# Patient Record
Sex: FEMALE | Race: WHITE | HISPANIC OR LATINO | Employment: UNEMPLOYED | URBAN - METROPOLITAN AREA
[De-identification: names, ages, dates, MRNs, and addresses within clinical notes are randomized per-mention and may not be internally consistent; named-entity substitution may affect disease eponyms.]

---

## 2017-03-17 RX ORDER — LEVOTHYROXINE SODIUM 0.03 MG/1
TABLET ORAL
Qty: 30 TABLET | Refills: 3 | Status: SHIPPED | OUTPATIENT
Start: 2017-03-17 | End: 2017-07-14 | Stop reason: SDUPTHER

## 2017-03-22 ENCOUNTER — APPOINTMENT (OUTPATIENT)
Dept: WOMENS IMAGING | Facility: HOSPITAL | Age: 47
End: 2017-03-22

## 2017-03-22 PROCEDURE — 77063 BREAST TOMOSYNTHESIS BI: CPT | Performed by: RADIOLOGY

## 2017-03-22 PROCEDURE — 77067 SCR MAMMO BI INCL CAD: CPT | Performed by: RADIOLOGY

## 2017-03-23 ENCOUNTER — APPOINTMENT (OUTPATIENT)
Dept: WOMENS IMAGING | Facility: HOSPITAL | Age: 47
End: 2017-03-23

## 2017-03-23 ENCOUNTER — OFFICE VISIT (OUTPATIENT)
Dept: ENDOCRINOLOGY | Age: 47
End: 2017-03-23

## 2017-03-23 VITALS
HEIGHT: 64 IN | HEART RATE: 75 BPM | SYSTOLIC BLOOD PRESSURE: 116 MMHG | OXYGEN SATURATION: 97 % | BODY MASS INDEX: 22.36 KG/M2 | WEIGHT: 131 LBS | DIASTOLIC BLOOD PRESSURE: 68 MMHG

## 2017-03-23 DIAGNOSIS — E03.8 OTHER SPECIFIED HYPOTHYROIDISM: Primary | ICD-10-CM

## 2017-03-23 LAB
T4 FREE SERPL-MCNC: 1.45 NG/DL (ref 0.93–1.7)
TSH SERPL DL<=0.005 MIU/L-ACNC: 2.47 MIU/ML (ref 0.27–4.2)

## 2017-03-23 PROCEDURE — 99214 OFFICE O/P EST MOD 30 MIN: CPT | Performed by: INTERNAL MEDICINE

## 2017-03-23 RX ORDER — ERGOCALCIFEROL 1.25 MG/1
50000 CAPSULE ORAL WEEKLY
COMMUNITY
End: 2018-01-05 | Stop reason: SDUPTHER

## 2017-03-23 RX ORDER — ESTRADIOL 2 MG/1
TABLET ORAL
COMMUNITY
Start: 2017-03-17

## 2017-03-23 NOTE — PROGRESS NOTES
46 y.o.    Patient Care Team:  Deirdre Ngo MD as PCP - General (Obstetrics and Gynecology)    Chief Complaint:      Subjective     HPI  Torrie Street 46 y.o. WF is here as a follow up patient for hypothyroidism. Is referred by Dr. Ngo.   Currently on levothyroxine 25 µg oral daily.  Taking the medication everyday on empty stomach.    Patient reports that her fatigue significantly improved on the thyroid medication, her weight is stable, complains of cold intolerance occasionally and no heat intolerance, no constipation or diarrhea, is able to sleep well.  No complaints of tremors, no racing of heart and no eye symptoms.  Denied difficulty in swallowing, breathing or change in voice.  No family history of thyroid disease.       Pre mature menopause : Menstrual hx - Menarche at age 14 years, irregular periods with increased blood clots, underwent total hysterectomy for this reason at age 34. [id number]. She has been off and on on the HRT. Has been off on HRT for about 7 years but started back on the medication in 2016.   Estradiol dose was increased to 2 mg oral daily.  Patient does have history of maternal aunt having breast cancer. She does understand the risk benefits of being on the hormone replacement and her risk of getting breast cancer on hormone replacement therapy. Dr. Ngo is currently managing her HRT.        Interval History      The following portions of the patient's history were reviewed and updated as appropriate: allergies, current medications, past family history, past medical history, past social history, past surgical history and problem list.    Past Medical History:   Diagnosis Date   • Endometriosis    • Fracture     right hand   • Migraine    • Osteoporosis      Family History   Problem Relation Age of Onset   • Cancer Maternal Aunt      BREAST   • Diabetes Maternal Aunt    • Cancer Maternal Grandmother      VAGINAL     Social History     Social History   • Marital status: Single      Spouse name: N/A   • Number of children: N/A   • Years of education: N/A     Occupational History   • Not on file.     Social History Main Topics   • Smoking status: Never Smoker   • Smokeless tobacco: Never Used   • Alcohol use Yes      Comment: social   • Drug use: No   • Sexual activity: Not on file     Other Topics Concern   • Not on file     Social History Narrative     No Known Allergies    Current Outpatient Prescriptions:   •  Biotin 1000 MCG tablet, Take 1,000 mcg by mouth daily., Disp: , Rfl:   •  Cholecalciferol (VITAMIN D) 2000 UNITS capsule, Take 1 tablet/day by mouth daily., Disp: , Rfl:   •  estradiol (ESTRACE) 2 MG tablet, , Disp: , Rfl:   •  levothyroxine (SYNTHROID, LEVOTHROID) 25 MCG tablet, TAKE ONE TABLET BY MOUTH DAILY, Disp: 30 tablet, Rfl: 3  •  vitamin D (ERGOCALCIFEROL) 85239 UNITS capsule capsule, Take 50,000 Units by mouth 1 (One) Time Per Week., Disp: , Rfl:   •  aspirin-acetaminophen-caffeine (EXCEDRIN MIGRAINE) 250-250-65 MG per tablet, Take 2 tablets by mouth every 6 (six) hours as needed for headaches., Disp: , Rfl:         Review of Systems   Constitutional: Positive for chills and fatigue. Negative for appetite change and diaphoresis.   HENT: Negative for hearing loss, nosebleeds, postnasal drip, trouble swallowing and voice change.    Eyes: Negative for photophobia, discharge and visual disturbance.   Respiratory: Negative for cough, shortness of breath and wheezing.    Cardiovascular: Positive for palpitations. Negative for chest pain and leg swelling.   Gastrointestinal: Negative for abdominal distention, abdominal pain, constipation, diarrhea, nausea and vomiting.   Endocrine: Positive for cold intolerance. Negative for heat intolerance, polydipsia and polyuria.   Genitourinary: Negative for dysuria, frequency and hematuria.   Musculoskeletal: Positive for back pain. Negative for arthralgias and myalgias.   Skin: Negative for pallor, rash and wound.   Neurological: Positive  "for dizziness and light-headedness. Negative for tremors, seizures, syncope, weakness, numbness and headaches.   Hematological: Negative for adenopathy.   Psychiatric/Behavioral: Positive for sleep disturbance. Negative for agitation and confusion. The patient is nervous/anxious.        Objective       Vitals:    03/23/17 1027   BP: 116/68   Pulse: 75   SpO2: 97%   Weight: 131 lb (59.4 kg)   Height: 64\" (162.6 cm)     Body mass index is 22.49 kg/(m^2).      Physical Exam   Constitutional: She is oriented to person, place, and time. She appears well-developed and well-nourished. No distress.   HENT:   Head: Normocephalic and atraumatic.   Mouth/Throat: Oropharynx is clear and moist.   Eyes: Conjunctivae and EOM are normal. Pupils are equal, round, and reactive to light. No scleral icterus.   Neck: Normal range of motion. Neck supple. No tracheal deviation present. No thyromegaly present.   Cardiovascular: Normal rate, regular rhythm and normal heart sounds.  Exam reveals no friction rub.    No murmur heard.  Pulmonary/Chest: Effort normal and breath sounds normal. No stridor. She has no wheezes. She has no rales.   Abdominal: Soft. Bowel sounds are normal. She exhibits no distension. There is no tenderness.   Musculoskeletal: Normal range of motion. She exhibits no edema.   Lymphadenopathy:     She has no cervical adenopathy.   Neurological: She is alert and oriented to person, place, and time. She has normal reflexes. She displays normal reflexes.   Skin: Skin is warm and dry. She is not diaphoretic.   Psychiatric: She has a normal mood and affect. Judgment normal.   Vitals reviewed.    Results Review:     I reviewed the patient's new clinical results.    Medical records reviewed  Summary:Done      Office Visit on 10/18/2016   Component Date Value Ref Range Status   • 25 Hydroxy, Vitamin D 10/18/2016 62.7  30.0 - 100.0 ng/mL Final    Comment: Reference Range for Total Vitamin D 25(OH)  Deficiency Â  Â <20.0 " ng/mL  Insufficiency 21-29 ng/mL  Sufficiency Â   ng/mL  Toxicity      >100 ng/ml     • Vitamin B-12 10/18/2016 323  211 - 946 pg/mL Final   • Folate 10/18/2016 16.52  7.30 - 26.10 ng/mL Final   • Hemoglobin A1C 10/18/2016 5.20  4.80 - 5.60 % Final    Comment: Hemoglobin A1C Ranges:  Increased Risk for Diabetes  5.7% to 6.4%  Diabetes                     >= 6.5%  Diabetic Goal                < 7.0%     • TSH 10/18/2016 3.150  0.270 - 4.200 mIU/mL Final   • Thyroid Peroxidase Antibody 10/18/2016 332* 0 - 34 IU/mL Final     Lab Results   Component Value Date    HGBA1C 5.20 10/18/2016     No results found for: GLUF, MICROALBUR, LDLCALC, CREATININE  Imaging Results (most recent)     None                Assessment and Plan:    Diagnoses and all orders for this visit:    Other specified hypothyroidism  -     TSH  -     T4, Free  -     TSH; Future  -     T4, Free; Future    Hypothyroidism  Continue levothyroxine 25 µg oral daily  Will check TSH and free T4 today    Premature menopause  Managed by Dr. Ngo.  Currently on hormone replacement therapy.    Osteopenia  Patient receiving reclast injections.  Managed by Dr. Ngo.     The total time spent more than 25 min for old record and lab review and face- to- face, of which greater than 50% of time was spent in counseling the patient on treatment options, instructions for management/treatment and follow up and importance of compliance with chosen management or treatment options

## 2017-06-21 ENCOUNTER — RESULTS ENCOUNTER (OUTPATIENT)
Dept: ENDOCRINOLOGY | Age: 47
End: 2017-06-21

## 2017-06-21 DIAGNOSIS — E03.8 OTHER SPECIFIED HYPOTHYROIDISM: ICD-10-CM

## 2017-06-23 ENCOUNTER — OFFICE VISIT (OUTPATIENT)
Dept: ENDOCRINOLOGY | Age: 47
End: 2017-06-23

## 2017-06-23 VITALS
HEIGHT: 64 IN | DIASTOLIC BLOOD PRESSURE: 62 MMHG | BODY MASS INDEX: 22.36 KG/M2 | SYSTOLIC BLOOD PRESSURE: 122 MMHG | OXYGEN SATURATION: 98 % | HEART RATE: 80 BPM | WEIGHT: 131 LBS

## 2017-06-23 DIAGNOSIS — E55.9 VITAMIN D DEFICIENCY: ICD-10-CM

## 2017-06-23 DIAGNOSIS — E03.8 OTHER SPECIFIED HYPOTHYROIDISM: Primary | ICD-10-CM

## 2017-06-23 DIAGNOSIS — M54.32 SCIATICA OF LEFT SIDE: ICD-10-CM

## 2017-06-23 PROCEDURE — 99214 OFFICE O/P EST MOD 30 MIN: CPT | Performed by: INTERNAL MEDICINE

## 2017-06-23 NOTE — PROGRESS NOTES
47 y.o.    Patient Care Team:  Deirdre Ngo MD as PCP - General (Obstetrics and Gynecology)    Chief Complaint:    3 Month Follow Up/ Hypothyroidism  Subjective     HPI  47-year-old female is here for the evaluation of hypothyroidism.  Currently on levothyroxine 25 µg oral daily.  Is compliant with the medication and takes at on empty stomach.  Today in the clinic patient reports that her fatigue is better, able to manage her daily activities, she still has back and knee joint pains, she recently went on a vacation and tripped and as a result things that she has sciatica on her left side, weight has been stable, still complains of cold intolerance, no significant hair loss, occasional leg cramps, normal bowel movements, sleep is better on estrogen.  No tremors, no eye symptoms, no racing of heart.  No difficulty in swallowing breathing or change in voice.  No family history of thyroid disease.    Premature menopause  Menarche at age 14, underwent total hysterectomy due to menorrhagia at age 34, G3 Para 2 Live 2  Due to the complaints of hot flashes patient is currently on estrogen 2 mg oral daily.  She has a family history of breast cancer and during every visit has been emphasizing to the patient to taper the estrogen replacement.  Dr. Ngo is currently managing her hormone replacement therapy.     Interval History      The following portions of the patient's history were reviewed and updated as appropriate: allergies, current medications, past family history, past medical history, past social history, past surgical history and problem list.    Past Medical History:   Diagnosis Date   • Endometriosis    • Fracture     right hand   • Migraine    • Osteoporosis      Family History   Problem Relation Age of Onset   • Cancer Maternal Aunt      BREAST   • Diabetes Maternal Aunt    • Cancer Maternal Grandmother      VAGINAL     Social History     Social History   • Marital status: Single     Spouse name: N/A   •  Number of children: N/A   • Years of education: N/A     Occupational History   • Not on file.     Social History Main Topics   • Smoking status: Never Smoker   • Smokeless tobacco: Never Used   • Alcohol use Yes      Comment: social   • Drug use: No   • Sexual activity: Not on file     Other Topics Concern   • Not on file     Social History Narrative     No Known Allergies    Current Outpatient Prescriptions:   •  aspirin-acetaminophen-caffeine (EXCEDRIN MIGRAINE) 250-250-65 MG per tablet, Take 2 tablets by mouth every 6 (six) hours as needed for headaches., Disp: , Rfl:   •  Biotin 1000 MCG tablet, Take 1,000 mcg by mouth daily., Disp: , Rfl:   •  Cholecalciferol (VITAMIN D) 2000 UNITS capsule, Take 1 tablet/day by mouth daily., Disp: , Rfl:   •  estradiol (ESTRACE) 2 MG tablet, , Disp: , Rfl:   •  levothyroxine (SYNTHROID, LEVOTHROID) 25 MCG tablet, TAKE ONE TABLET BY MOUTH DAILY, Disp: 30 tablet, Rfl: 3  •  vitamin D (ERGOCALCIFEROL) 20049 UNITS capsule capsule, Take 50,000 Units by mouth 1 (One) Time Per Week., Disp: , Rfl:         Review of Systems   Constitutional: Negative for fever.   HENT: Negative for facial swelling, nosebleeds, trouble swallowing and voice change.    Eyes: Negative for pain and redness.   Respiratory: Negative for shortness of breath and wheezing.    Cardiovascular: Negative for palpitations and leg swelling.   Gastrointestinal: Negative for abdominal pain, diarrhea and vomiting.   Endocrine: Negative for polydipsia and polyuria.   Genitourinary: Negative for decreased urine volume and frequency.   Musculoskeletal: Positive for arthralgias. Negative for joint swelling and neck pain.   Skin: Negative for rash.   Allergic/Immunologic: Negative for immunocompromised state.   Neurological: Negative for seizures and facial asymmetry.   Hematological: Bruises/bleeds easily.   Psychiatric/Behavioral: Negative for agitation and confusion.       Objective       Vitals:    06/23/17 1107   BP:  "122/62   Pulse: 80   SpO2: 98%   Weight: 131 lb (59.4 kg)   Height: 64\" (162.6 cm)     Body mass index is 22.49 kg/(m^2).      Physical Exam   Constitutional: She is oriented to person, place, and time. She appears well-nourished.   HENT:   Head: Normocephalic and atraumatic.   Eyes: Conjunctivae and EOM are normal. No scleral icterus.   Neck: Normal range of motion. Neck supple. No thyromegaly present.   Thyroid palpable and no nodules   Cardiovascular: Normal rate and normal heart sounds.  Exam reveals no friction rub.    No murmur heard.  HR -  80   Pulmonary/Chest: Effort normal and breath sounds normal. No stridor. She has no wheezes. She has no rales.   Abdominal: Soft. Bowel sounds are normal. She exhibits no distension. There is no tenderness.   Musculoskeletal: She exhibits tenderness. She exhibits no edema.   Left hip tenderness   Lymphadenopathy:     She has no cervical adenopathy.   Neurological: She is alert and oriented to person, place, and time. She has normal reflexes.   Skin: Skin is warm and dry. She is not diaphoretic.   Psychiatric: She has a normal mood and affect.   Vitals reviewed.    Results Review:     I reviewed the patient's new clinical results.    Medical records reviewed  Summary: done      Office Visit on 03/23/2017   Component Date Value Ref Range Status   • TSH 03/23/2017 2.470  0.270 - 4.200 mIU/mL Final   • Free T4 03/23/2017 1.45  0.93 - 1.70 ng/dL Final     Lab Results   Component Value Date    HGBA1C 5.20 10/18/2016     No results found for: GLUF, MICROALBUR, LDLCALC, CREATININE  Imaging Results (most recent)     None                Assessment and Plan:    Torrie was seen today for hypothyroidism.    Diagnoses and all orders for this visit:    Other specified hypothyroidism  -     TSH  -     T4, Free  -     TSH; Future  -     T4, Free; Future  -     Hemoglobin A1c; Future    Sciatica of left side  -     TSH  -     T4, Free  -     TSH; Future  -     T4, Free; Future  -     " Hemoglobin A1c; Future  -     Ambulatory Referral to Orthopedic Surgery    Vitamin D deficiency  -     TSH  -     T4, Free  -     TSH; Future  -     T4, Free; Future  -     Hemoglobin A1c; Future    Hypothyroidism  Continue levothyroxine 25 µg oral daily.  Will check TFTs today and adjust the dose of levothyroxine accordingly.    Premature menopause  Currently on hormone replacement therapy being managed by primary care physician.  Discussed with the patient to try to taper the dose.    Vitamin D deficiency  On ergocalciferol 50,000 units every weekly.    Sciatica on left side -   Will refer the patient to orthopedics.    13 minutes out of 25 minutes face to face spent in counseling the patient extensively on TREATMENT PLAN AND SIDE EFFECTS OF HORMONE REPLACEMENT THERAPY.

## 2017-06-24 LAB
T4 FREE SERPL-MCNC: 1.27 NG/DL (ref 0.93–1.7)
TSH SERPL DL<=0.005 MIU/L-ACNC: 2.6 MIU/ML (ref 0.27–4.2)

## 2017-07-14 RX ORDER — LEVOTHYROXINE SODIUM 0.03 MG/1
TABLET ORAL
Qty: 30 TABLET | Refills: 2 | Status: SHIPPED | OUTPATIENT
Start: 2017-07-14 | End: 2017-10-10 | Stop reason: SDUPTHER

## 2017-10-11 RX ORDER — LEVOTHYROXINE SODIUM 0.03 MG/1
TABLET ORAL
Qty: 30 TABLET | Refills: 1 | Status: SHIPPED | OUTPATIENT
Start: 2017-10-11 | End: 2017-12-20 | Stop reason: SDUPTHER

## 2017-12-20 ENCOUNTER — RESULTS ENCOUNTER (OUTPATIENT)
Dept: ENDOCRINOLOGY | Age: 47
End: 2017-12-20

## 2017-12-20 DIAGNOSIS — E03.8 OTHER SPECIFIED HYPOTHYROIDISM: ICD-10-CM

## 2017-12-20 DIAGNOSIS — M54.32 SCIATICA OF LEFT SIDE: ICD-10-CM

## 2017-12-20 DIAGNOSIS — E55.9 VITAMIN D DEFICIENCY: ICD-10-CM

## 2017-12-20 RX ORDER — LEVOTHYROXINE SODIUM 0.03 MG/1
25 TABLET ORAL DAILY
Qty: 90 TABLET | Refills: 3 | Status: SHIPPED | OUTPATIENT
Start: 2017-12-20 | End: 2020-05-07

## 2018-01-05 ENCOUNTER — OFFICE VISIT (OUTPATIENT)
Dept: ORTHOPEDIC SURGERY | Facility: CLINIC | Age: 48
End: 2018-01-05

## 2018-01-05 VITALS — BODY MASS INDEX: 23.92 KG/M2 | WEIGHT: 135 LBS | HEIGHT: 63 IN | TEMPERATURE: 98.2 F

## 2018-01-05 DIAGNOSIS — M54.50 LUMBAR SPINE PAIN: Primary | ICD-10-CM

## 2018-01-05 DIAGNOSIS — M54.31 BILATERAL SCIATICA: ICD-10-CM

## 2018-01-05 DIAGNOSIS — M54.32 BILATERAL SCIATICA: ICD-10-CM

## 2018-01-05 PROCEDURE — 72100 X-RAY EXAM L-S SPINE 2/3 VWS: CPT | Performed by: ORTHOPAEDIC SURGERY

## 2018-01-05 PROCEDURE — 99204 OFFICE O/P NEW MOD 45 MIN: CPT | Performed by: ORTHOPAEDIC SURGERY

## 2018-01-05 RX ORDER — METHYLPREDNISOLONE 4 MG/1
TABLET ORAL
Qty: 21 TABLET | Refills: 0 | Status: SHIPPED | OUTPATIENT
Start: 2018-01-05 | End: 2018-01-12 | Stop reason: SDUPTHER

## 2018-01-10 ENCOUNTER — HOSPITAL ENCOUNTER (OUTPATIENT)
Dept: MRI IMAGING | Facility: HOSPITAL | Age: 48
Discharge: HOME | End: 2018-01-10
Attending: ORTHOPAEDIC SURGERY | Admitting: ORTHOPAEDIC SURGERY

## 2018-01-10 DIAGNOSIS — M54.31 BILATERAL SCIATICA: ICD-10-CM

## 2018-01-10 DIAGNOSIS — M54.50 LUMBAR SPINE PAIN: ICD-10-CM

## 2018-01-10 DIAGNOSIS — M54.32 BILATERAL SCIATICA: ICD-10-CM

## 2018-01-10 PROCEDURE — 72148 MRI LUMBAR SPINE W/O DYE: CPT

## 2018-01-11 ENCOUNTER — TELEPHONE (OUTPATIENT)
Dept: ORTHOPEDIC SURGERY | Facility: CLINIC | Age: 48
End: 2018-01-11

## 2018-01-12 ENCOUNTER — TELEPHONE (OUTPATIENT)
Dept: ENDOCRINOLOGY | Age: 48
End: 2018-01-12

## 2018-01-12 DIAGNOSIS — G89.29 OTHER CHRONIC BACK PAIN: Primary | ICD-10-CM

## 2018-01-12 DIAGNOSIS — M54.9 OTHER CHRONIC BACK PAIN: Primary | ICD-10-CM

## 2018-01-12 DIAGNOSIS — M54.50 LUMBAR SPINE PAIN: Primary | ICD-10-CM

## 2018-01-12 RX ORDER — METHYLPREDNISOLONE 4 MG/1
TABLET ORAL
Qty: 21 TABLET | Refills: 0 | Status: SHIPPED | OUTPATIENT
Start: 2018-01-12

## 2018-01-12 NOTE — TELEPHONE ENCOUNTER
Spoke with patient about new referral  Dr Hubbard put in a referral     Patient called the office wanting to speak with  Dr Hubbard about getting a second opinon for a Orthopedic surgeon

## 2018-01-19 ENCOUNTER — TELEPHONE (OUTPATIENT)
Dept: ENDOCRINOLOGY | Age: 48
End: 2018-01-19

## 2018-01-19 NOTE — TELEPHONE ENCOUNTER
PATIENT CALLED OFFICE LETTING US KNOW THAT SHE HAS NOT HEARD FROM THE ORTHO SURGEAN  OFFICE     CALLED AND SPOKE WITH PATIENT LET THE PATIENT KNOW THAT A REFERRAL HAS BEEN SENT OVER TO THE ORTHO SURGEON AND SHE SHOULD CALL AND SET UP AN APPT. PATIENT UNDERSTOOD

## 2018-02-16 ENCOUNTER — TELEPHONE (OUTPATIENT)
Dept: ORTHOPEDIC SURGERY | Facility: CLINIC | Age: 48
End: 2018-02-16

## 2018-11-07 ENCOUNTER — APPOINTMENT (OUTPATIENT)
Dept: WOMENS IMAGING | Facility: HOSPITAL | Age: 48
End: 2018-11-07

## 2018-11-07 PROCEDURE — 77063 BREAST TOMOSYNTHESIS BI: CPT | Performed by: RADIOLOGY

## 2018-11-07 PROCEDURE — 77067 SCR MAMMO BI INCL CAD: CPT | Performed by: RADIOLOGY

## 2018-11-20 ENCOUNTER — APPOINTMENT (OUTPATIENT)
Dept: WOMENS IMAGING | Facility: HOSPITAL | Age: 48
End: 2018-11-20

## 2018-11-20 PROCEDURE — G0279 TOMOSYNTHESIS, MAMMO: HCPCS | Performed by: RADIOLOGY

## 2018-11-20 PROCEDURE — 77065 DX MAMMO INCL CAD UNI: CPT | Performed by: RADIOLOGY

## 2018-11-20 PROCEDURE — MDREVIEWSP: Performed by: RADIOLOGY

## 2018-11-20 PROCEDURE — 76641 ULTRASOUND BREAST COMPLETE: CPT | Performed by: RADIOLOGY

## 2018-11-20 PROCEDURE — 77061 BREAST TOMOSYNTHESIS UNI: CPT | Performed by: RADIOLOGY

## 2020-01-27 ENCOUNTER — APPOINTMENT (OUTPATIENT)
Dept: WOMENS IMAGING | Facility: HOSPITAL | Age: 50
End: 2020-01-27

## 2020-01-27 PROCEDURE — 77067 SCR MAMMO BI INCL CAD: CPT | Performed by: RADIOLOGY

## 2020-01-27 PROCEDURE — 77063 BREAST TOMOSYNTHESIS BI: CPT | Performed by: RADIOLOGY

## 2020-01-31 ENCOUNTER — TELEPHONE (OUTPATIENT)
Dept: ENDOCRINOLOGY | Age: 50
End: 2020-01-31

## 2020-02-03 ENCOUNTER — TELEPHONE (OUTPATIENT)
Dept: ENDOCRINOLOGY | Age: 50
End: 2020-02-03

## 2020-02-03 NOTE — TELEPHONE ENCOUNTER
PATIENT WALKED IN AND I GAVE THE INFORMATION TO CRYSTAL   SHE IS GOING TO TALK TO JEM AND TRY TO FIND HER A SPOT ASAP    DR MENDOZA HAS SENT OVER THE BONE DENSITY RESULTS     [phone]

## 2020-02-03 NOTE — TELEPHONE ENCOUNTER
Spoke with Dr Hubbard about patient appointment  Patient can be schedule for next available appointment

## 2020-02-14 ENCOUNTER — APPOINTMENT (OUTPATIENT)
Dept: WOMENS IMAGING | Facility: HOSPITAL | Age: 50
End: 2020-02-14

## 2020-02-14 PROCEDURE — 76641 ULTRASOUND BREAST COMPLETE: CPT | Performed by: RADIOLOGY

## 2020-02-14 PROCEDURE — 77065 DX MAMMO INCL CAD UNI: CPT | Performed by: RADIOLOGY

## 2020-02-14 PROCEDURE — G0279 TOMOSYNTHESIS, MAMMO: HCPCS | Performed by: RADIOLOGY

## 2020-02-14 PROCEDURE — 77061 BREAST TOMOSYNTHESIS UNI: CPT | Performed by: RADIOLOGY

## 2020-05-07 ENCOUNTER — OFFICE VISIT (OUTPATIENT)
Dept: ENDOCRINOLOGY | Age: 50
End: 2020-05-07

## 2020-05-07 DIAGNOSIS — R53.82 CHRONIC FATIGUE: ICD-10-CM

## 2020-05-07 DIAGNOSIS — E03.9 ACQUIRED HYPOTHYROIDISM: Primary | ICD-10-CM

## 2020-05-07 PROCEDURE — 99443 PR PHYS/QHP TELEPHONE EVALUATION 21-30 MIN: CPT | Performed by: INTERNAL MEDICINE

## 2020-05-07 RX ORDER — LEVOTHYROXINE SODIUM 0.05 MG/1
TABLET ORAL
COMMUNITY
Start: 2020-05-05

## 2020-05-07 NOTE — PROGRESS NOTES
49 y.o.    Patient Care Team:  Deirdre Ngo MD as PCP - General (Obstetrics and Gynecology)    Chief Complaint:      Subjective     HPI    49-year-old female is here for the evaluation of hypothyroidism.    This is a telephone visit during the covid pandemic.  Patient was last seen by me in 2017.    Today in visit patient reports that when she went to her OB/GYN for her routine blood work-up her thyroid levels were noted to be abnormal and the dosage was changed to 50 mcg.  She reports that on review of her blood work-up she felt that some of the symptoms that she was experiencing prior to that had made sense as she was having some increased fatigue, loss of hair and feeling of unwell.  She is taking the medication on empty stomach.     She reports some of the symptoms are still persistent, she also has some racing of heart.  She is not sure if she is getting too much of the medication or less amount of the medication.  No issues with shortness of breath or change in voice.  No family history of thyroid disease.    Menopause  Patient underwent total hysterectomy due to menorrhagia at age 34.  She is on hormone replacement therapy through her OB/GYN.  She does have family history of breast cancer, she has been emphasized on estrogen being 1 of the risk factors for it and she understands the risks and the benefits of the medication.      You have chosen to receive care through a telephone visit. Do you consent to use a telephone visit for your medical care today? Yes      Reviewed primary care physician's/consulting physician documentation and lab results :     Interval History      The following portions of the patient's history were reviewed and updated as appropriate: allergies, current medications, past family history, past medical history, past social history, past surgical history and problem list.    Past Medical History:   Diagnosis Date   • Endometriosis    • Fracture     right hand   • Migraine    •  Osteoporosis      Family History   Problem Relation Age of Onset   • Cancer Maternal Aunt         BREAST   • Diabetes Maternal Aunt    • Cancer Maternal Grandmother         VAGINAL     Social History     Socioeconomic History   • Marital status:      Spouse name: Not on file   • Number of children: Not on file   • Years of education: Not on file   • Highest education level: Not on file   Tobacco Use   • Smoking status: Never Smoker   • Smokeless tobacco: Never Used   Substance and Sexual Activity   • Alcohol use: Yes     Comment: social   • Drug use: No   • Sexual activity: Defer     No Known Allergies    Current Outpatient Medications:   •  aspirin-acetaminophen-caffeine (EXCEDRIN MIGRAINE) 250-250-65 MG per tablet, Take 2 tablets by mouth every 6 (six) hours as needed for headaches., Disp: , Rfl:   •  Biotin 1000 MCG tablet, Take 1,000 mcg by mouth daily., Disp: , Rfl:   •  Cholecalciferol (VITAMIN D) 2000 UNITS capsule, Take 1 tablet/day by mouth daily., Disp: , Rfl:   •  estradiol (ESTRACE) 2 MG tablet, , Disp: , Rfl:   •  levothyroxine (SYNTHROID, LEVOTHROID) 50 MCG tablet, , Disp: , Rfl:   •  MethylPREDNISolone (MEDROL, CLAUDIA,) 4 MG tablet, Use as directed by package instructions, Disp: 21 tablet, Rfl: 0        Review of Systems   Constitutional: Positive for fatigue. Negative for appetite change and fever.   HENT: Negative for trouble swallowing.    Eyes: Negative for visual disturbance.   Respiratory: Negative for shortness of breath.    Cardiovascular: Positive for palpitations and leg swelling.   Gastrointestinal: Negative for abdominal pain and vomiting.   Endocrine: Negative for polydipsia and polyuria.   Musculoskeletal: Negative for joint swelling and neck pain.   Skin: Negative for rash.   Neurological: Positive for weakness and numbness.   Psychiatric/Behavioral: Negative for behavioral problems.     I have reviewed the ROS as documented by the MA; Tenisha Hubbard MD.      Objective       There  "were no vitals filed for this visit.  There is no height or weight on file to calculate BMI.      Physical Exam  General appearance-pleasant, no distress  Respiratory-normal breathing appreciated.  No respiratory distress noted  Ear nose and throat-no hard of hearing.  Neurological assessment-alert and oriented x3.    Results Review:     I reviewed the patient's new clinical results and mentioned them above in HPI and in plan as well.    Medical records reviewed  Summary:Done      Office Visit on 06/23/2017   Component Date Value Ref Range Status   • TSH 06/23/2017 2.600  0.270 - 4.200 mIU/mL Final   • Free T4 06/23/2017 1.27  0.93 - 1.70 ng/dL Final     Lab Results   Component Value Date    HGBA1C 5.20 10/18/2016     No results found for: GLUF, MICROALBUR, CREATININE  Imaging Results (Most Recent)     None                Assessment and Plan:    Diagnoses and all orders for this visit:    Acquired hypothyroidism  -     TSH; Future  -     T4, Free; Future  -     TSH; Future  -     T4, Free; Future  -     Vitamin B12 & Folate; Future  -     Vitamin D 25 Hydroxy; Future  -     Vitamin B12 & Folate; Future  -     Vitamin D 25 Hydroxy; Future    Chronic fatigue  -     Vitamin B12 & Folate; Future  -     Vitamin D 25 Hydroxy; Future      Hypothyroidism  Levels worse  Check thyroid panel  Get the records from Dr. Ngo's office.  Adjust the dosage of levothyroxine based on her blood work-up.    Chronic fatigue  We will check vitamin D, B12 and folic acid levels.    Reviewed Lab results with the patient.             13   ( greater than 50% of the time) out of  25 minutes  spent counseling the patient on blood work-up necessary, treatment plan and changes    Tenisha Hubbard MD  05/07/20    [facility] Dragon / transcription disclaimer:     \"Dictated utilizing Dragon dictation\".      "

## 2020-05-11 ENCOUNTER — RESULTS ENCOUNTER (OUTPATIENT)
Dept: ENDOCRINOLOGY | Age: 50
End: 2020-05-11

## 2020-05-11 DIAGNOSIS — R53.82 CHRONIC FATIGUE: ICD-10-CM

## 2020-05-11 DIAGNOSIS — E03.9 ACQUIRED HYPOTHYROIDISM: ICD-10-CM

## 2020-05-12 ENCOUNTER — LAB (OUTPATIENT)
Dept: ENDOCRINOLOGY | Age: 50
End: 2020-05-12

## 2020-05-12 DIAGNOSIS — E03.9 ACQUIRED HYPOTHYROIDISM: ICD-10-CM

## 2020-05-13 LAB
25(OH)D3+25(OH)D2 SERPL-MCNC: 51 NG/ML (ref 30–100)
FOLATE SERPL-MCNC: >20 NG/ML (ref 4.78–24.2)
T4 FREE SERPL-MCNC: 1.14 NG/DL (ref 0.93–1.7)
TSH SERPL DL<=0.005 MIU/L-ACNC: 2.85 UIU/ML (ref 0.27–4.2)
VIT B12 SERPL-MCNC: 336 PG/ML (ref 211–946)

## 2020-09-22 ENCOUNTER — TELEPHONE (OUTPATIENT)
Dept: ENDOCRINOLOGY | Age: 50
End: 2020-09-22

## 2020-09-23 DIAGNOSIS — E03.9 ACQUIRED HYPOTHYROIDISM: Primary | ICD-10-CM

## 2020-09-30 ENCOUNTER — RESULTS ENCOUNTER (OUTPATIENT)
Dept: ENDOCRINOLOGY | Age: 50
End: 2020-09-30

## 2020-09-30 DIAGNOSIS — E03.9 ACQUIRED HYPOTHYROIDISM: ICD-10-CM

## 2021-03-31 ENCOUNTER — APPOINTMENT (OUTPATIENT)
Dept: WOMENS IMAGING | Facility: HOSPITAL | Age: 51
End: 2021-03-31

## 2021-03-31 PROCEDURE — 77063 BREAST TOMOSYNTHESIS BI: CPT | Performed by: RADIOLOGY

## 2021-03-31 PROCEDURE — 77067 SCR MAMMO BI INCL CAD: CPT | Performed by: RADIOLOGY

## 2021-05-05 ENCOUNTER — APPOINTMENT (OUTPATIENT)
Dept: WOMENS IMAGING | Facility: HOSPITAL | Age: 51
End: 2021-05-05

## 2021-05-05 PROCEDURE — 77065 DX MAMMO INCL CAD UNI: CPT | Performed by: RADIOLOGY

## 2021-05-05 PROCEDURE — 77061 BREAST TOMOSYNTHESIS UNI: CPT | Performed by: RADIOLOGY

## 2021-05-05 PROCEDURE — 76641 ULTRASOUND BREAST COMPLETE: CPT | Performed by: RADIOLOGY

## 2021-05-05 PROCEDURE — G0279 TOMOSYNTHESIS, MAMMO: HCPCS | Performed by: RADIOLOGY

## 2021-10-27 ENCOUNTER — TRANSCRIBE ORDERS (OUTPATIENT)
Dept: ADMINISTRATIVE | Facility: HOSPITAL | Age: 51
End: 2021-10-27

## 2021-10-27 ENCOUNTER — HOSPITAL ENCOUNTER (OUTPATIENT)
Dept: CT IMAGING | Facility: HOSPITAL | Age: 51
Discharge: HOME | End: 2021-10-27
Admitting: NURSE PRACTITIONER

## 2021-10-27 DIAGNOSIS — R10.31 ABDOMINAL PAIN, RIGHT LOWER QUADRANT: ICD-10-CM

## 2021-10-27 DIAGNOSIS — R10.31 ABDOMINAL PAIN, RIGHT LOWER QUADRANT: Primary | ICD-10-CM

## 2021-10-27 PROCEDURE — 74176 CT ABD & PELVIS W/O CONTRAST: CPT

## 2021-10-28 ENCOUNTER — TRANSCRIBE ORDERS (OUTPATIENT)
Dept: ADMINISTRATIVE | Facility: HOSPITAL | Age: 51
End: 2021-10-28

## 2021-10-28 DIAGNOSIS — R10.31 RLQ ABDOMINAL PAIN: Primary | ICD-10-CM

## 2021-10-28 DIAGNOSIS — R93.5 ABNORMAL CT OF THE ABDOMEN: ICD-10-CM

## 2021-10-29 ENCOUNTER — HOSPITAL ENCOUNTER (OUTPATIENT)
Dept: CT IMAGING | Facility: HOSPITAL | Age: 51
Discharge: HOME | End: 2021-10-29
Admitting: NURSE PRACTITIONER

## 2021-10-29 DIAGNOSIS — R10.31 RLQ ABDOMINAL PAIN: ICD-10-CM

## 2021-10-29 DIAGNOSIS — R93.5 ABNORMAL CT OF THE ABDOMEN: ICD-10-CM

## 2021-10-29 PROCEDURE — 25010000002 IOPAMIDOL 61 % SOLUTION: Performed by: NURSE PRACTITIONER

## 2021-10-29 PROCEDURE — 74177 CT ABD & PELVIS W/CONTRAST: CPT

## 2021-10-29 RX ADMIN — IOPAMIDOL 100 ML: 612 INJECTION, SOLUTION INTRAVENOUS at 10:34

## 2023-11-06 ENCOUNTER — HOSPITAL ENCOUNTER (OUTPATIENT)
Dept: GENERAL RADIOLOGY | Facility: HOSPITAL | Age: 53
Discharge: HOME | End: 2023-11-06
Admitting: NURSE PRACTITIONER
Payer: COMMERCIAL

## 2023-11-06 ENCOUNTER — TRANSCRIBE ORDERS (OUTPATIENT)
Dept: ADMINISTRATIVE | Facility: HOSPITAL | Age: 53
End: 2023-11-06
Payer: COMMERCIAL

## 2023-11-06 DIAGNOSIS — M79.674 PAIN IN TOES OF BOTH FEET: Primary | ICD-10-CM

## 2023-11-06 DIAGNOSIS — M79.675 PAIN IN TOES OF BOTH FEET: Primary | ICD-10-CM

## 2023-11-06 PROCEDURE — 73630 X-RAY EXAM OF FOOT: CPT
